# Patient Record
Sex: FEMALE | Race: WHITE | NOT HISPANIC OR LATINO | URBAN - METROPOLITAN AREA
[De-identification: names, ages, dates, MRNs, and addresses within clinical notes are randomized per-mention and may not be internally consistent; named-entity substitution may affect disease eponyms.]

---

## 2021-05-03 ENCOUNTER — OFFICE VISIT (OUTPATIENT)
Dept: URGENT CARE | Facility: CLINIC | Age: 26
End: 2021-05-03
Payer: COMMERCIAL

## 2021-05-03 VITALS
BODY MASS INDEX: 17.36 KG/M2 | HEIGHT: 66 IN | HEART RATE: 62 BPM | WEIGHT: 108 LBS | SYSTOLIC BLOOD PRESSURE: 119 MMHG | RESPIRATION RATE: 18 BRPM | DIASTOLIC BLOOD PRESSURE: 63 MMHG | OXYGEN SATURATION: 98 % | TEMPERATURE: 98.6 F

## 2021-05-03 DIAGNOSIS — L25.9 CONTACT DERMATITIS, UNSPECIFIED CONTACT DERMATITIS TYPE, UNSPECIFIED TRIGGER: Primary | ICD-10-CM

## 2021-05-03 PROCEDURE — 99203 OFFICE O/P NEW LOW 30 MIN: CPT | Performed by: PHYSICIAN ASSISTANT

## 2021-05-03 RX ORDER — METHYLPREDNISOLONE 4 MG/1
TABLET ORAL
Qty: 1 EACH | Refills: 0 | Status: SHIPPED | OUTPATIENT
Start: 2021-05-03

## 2021-05-03 NOTE — PROGRESS NOTES
[facility]        NAME: Eustaquio Severs is a 22 y o  female  : 1995    MRN: [id number]  DATE: May 3, 2021  TIME: 10:09 AM    Assessment and Plan   Contact dermatitis, unspecified contact dermatitis type, unspecified trigger [L25 9]  1  Contact dermatitis, unspecified contact dermatitis type, unspecified trigger  methylPREDNISolone 4 MG tablet therapy pack         Patient Instructions       Follow up with PCP in 3-5 days  Proceed to  ER if symptoms worsen  Chief Complaint     Chief Complaint   Patient presents with    Rash     Rash all over bosy itchy since last saturday          History of Present Illness       Patient is c/o rash for over a week  Pt reports red itchy bumps spread throughout  Pt reports noticing rash after being in the woods  Hx of poison ivy in past   Pt has been using Zyrtec w/ no relief  No fever, chills, body aches, N/V/D  Rash  This is a new problem  The current episode started 1 to 4 weeks ago  The problem has been gradually worsening since onset  The rash is diffuse  The rash is characterized by redness and itchiness  She was exposed to plant contact  Pertinent negatives include no cough, eye pain, fever, shortness of breath, sore throat or vomiting  Review of Systems   Review of Systems   Constitutional: Negative for chills and fever  HENT: Negative for ear pain and sore throat  Eyes: Negative for pain and visual disturbance  Respiratory: Negative for cough and shortness of breath  Cardiovascular: Negative for chest pain and palpitations  Gastrointestinal: Negative for abdominal pain and vomiting  Genitourinary: Negative for dysuria and hematuria  Musculoskeletal: Negative for arthralgias and back pain  Skin: Positive for rash  Negative for color change  Neurological: Negative for seizures and syncope  All other systems reviewed and are negative          Current Medications       Current Outpatient Medications:     methylPREDNISolone 4 MG tablet therapy pack, Use as directed on package, Disp: 1 each, Rfl: 0    Current Allergies     Allergies as of 05/03/2021    (No Known Allergies)            The following portions of the patient's history were reviewed and updated as appropriate: allergies, current medications, past family history, past medical history, past social history, past surgical history and problem list      Past Medical History:   Diagnosis Date    Drug use        History reviewed  No pertinent surgical history  Family History   Problem Relation Age of Onset    No Known Problems Mother     No Known Problems Father          Medications have been verified  Objective   /63   Pulse 62   Temp 98 6 °F (37 °C)   Resp 18   Ht 5' 6" (1 676 m)   Wt 49 kg (108 lb)   SpO2 98%   BMI 17 43 kg/m²   No LMP recorded  Physical Exam     Physical Exam  Constitutional:       Appearance: Normal appearance  HENT:      Head: Normocephalic and atraumatic  Nose: Nose normal       Mouth/Throat:      Mouth: Mucous membranes are moist    Eyes:      Extraocular Movements: Extraocular movements intact  Conjunctiva/sclera: Conjunctivae normal       Pupils: Pupils are equal, round, and reactive to light  Neck:      Musculoskeletal: Normal range of motion and neck supple  Cardiovascular:      Rate and Rhythm: Normal rate  Pulmonary:      Effort: Pulmonary effort is normal    Musculoskeletal: Normal range of motion  Skin:     General: Skin is warm and dry  Capillary Refill: Capillary refill takes less than 2 seconds  Neurological:      General: No focal deficit present  Mental Status: She is alert and oriented to person, place, and time     Psychiatric:         Mood and Affect: Mood normal          Behavior: Behavior normal

## 2024-08-21 ENCOUNTER — OFFICE VISIT (OUTPATIENT)
Dept: URGENT CARE | Facility: CLINIC | Age: 29
End: 2024-08-21
Payer: COMMERCIAL

## 2024-08-21 VITALS
HEART RATE: 88 BPM | DIASTOLIC BLOOD PRESSURE: 80 MMHG | BODY MASS INDEX: 16.71 KG/M2 | OXYGEN SATURATION: 98 % | TEMPERATURE: 97.8 F | RESPIRATION RATE: 20 BRPM | SYSTOLIC BLOOD PRESSURE: 129 MMHG | HEIGHT: 66 IN | WEIGHT: 104 LBS

## 2024-08-21 DIAGNOSIS — L70.0 ACNE VULGARIS: Primary | ICD-10-CM

## 2024-08-21 PROCEDURE — 99213 OFFICE O/P EST LOW 20 MIN: CPT | Performed by: PHYSICIAN ASSISTANT

## 2024-08-21 RX ORDER — BUPRENORPHINE 8 MG/1
TABLET SUBLINGUAL
COMMUNITY
Start: 2024-07-19 | End: 2025-01-15

## 2024-08-21 RX ORDER — CLINDAMYCIN AND BENZOYL PEROXIDE 10; 50 MG/G; MG/G
GEL TOPICAL 2 TIMES DAILY
Qty: 35 G | Refills: 0 | Status: SHIPPED | OUTPATIENT
Start: 2024-08-21

## 2024-08-21 NOTE — PROGRESS NOTES
[facility]        NAME: Serina Ball is a 28 y.o. female  : 1995    MRN: [id number]  DATE: 2024  TIME: 9:53 AM    Assessment and Plan   Acne vulgaris [L70.0]  1. Acne vulgaris  clindamycin-benzoyl peroxide (BENZACLIN) gel            Patient Instructions   Medication as prescribed.  Consider over-the-counter topical Abreva and gel to help with lip pain.  Make new appointment with gynecologist to discuss birth control alternatives.    Follow up with PCP in 3-5 days.  Proceed to  ER if symptoms worsen.    If tests have been performed at [facility], our office will contact you with results if changes need to be made to the care plan discussed with you at the visit.  You can review your full results on [facility].    Chief Complaint     Chief Complaint   Patient presents with    Pain     Face and lip pain and complaints of cystic acne started 5 days ago. Patient had an  24, patient states she was told to take a birth control pill however she threw it away saying she had a bad reaction last time and did not want to become aggressive. She is here today because she cannot take the breakout on her face anymore          History of Present Illness       Patient a 28-year-old female with significant past medical history of substance abuse on Subutex presents the office planing of facial acne with worsening symptoms the last 5 days.  States it is very painful and she now has a lesion on her lip.  Reports having an  a month ago and was instructed to take birth control but states she has had adverse reaction in the past of birth control which makes her very aggressive so she did not take it.  She tried washing her face multiple times and applying topical antibiotic ointment with no relief.        Review of Systems   Review of Systems   Constitutional:  Negative for fever.   Skin:  Positive for rash.         Current Medications       Current Outpatient Medications:      "buprenorphine (SUBUTEX) 8 mg, PLACE 1 TABLET (8 MG TOTAL) UNDER THE TONGUE 2 (TWO) TIMES DAILY. B, Disp: , Rfl:     clindamycin-benzoyl peroxide (BENZACLIN) gel, Apply topically 2 (two) times a day, Disp: 35 g, Rfl: 0    Current Allergies     Allergies as of 08/21/2024    (No Known Allergies)            The following portions of the patient's history were reviewed and updated as appropriate: allergies, current medications, past family history, past medical history, past social history, past surgical history and problem list.     Past Medical History:   Diagnosis Date    Drug use        History reviewed. No pertinent surgical history.    Family History   Problem Relation Age of Onset    No Known Problems Mother     No Known Problems Father          Medications have been verified.        Objective   /80   Pulse 88   Temp 97.8 °F (36.6 °C)   Resp 20   Ht 5' 6\" (1.676 m)   Wt 47.2 kg (104 lb)   LMP 05/22/2024 (Approximate)   SpO2 98%   BMI 16.79 kg/m²   Patient's last menstrual period was 05/22/2024 (approximate).       Physical Exam     Physical Exam  Vitals and nursing note reviewed.   Constitutional:       Appearance: She is well-developed.   HENT:      Head: Normocephalic and atraumatic.      Right Ear: External ear normal.      Left Ear: External ear normal.      Nose: Nose normal.   Eyes:      General: Lids are normal.      Conjunctiva/sclera: Conjunctivae normal.   Skin:     General: Skin is warm and dry.      Capillary Refill: Capillary refill takes less than 2 seconds.      Findings: Rash (Scattered acne to face, chin, and neck.  Single ulceration to lip.) present.   Neurological:      Mental Status: She is alert.                   "

## 2024-12-01 ENCOUNTER — OFFICE VISIT (OUTPATIENT)
Dept: URGENT CARE | Facility: CLINIC | Age: 29
End: 2024-12-01
Payer: COMMERCIAL

## 2024-12-01 VITALS
SYSTOLIC BLOOD PRESSURE: 113 MMHG | OXYGEN SATURATION: 97 % | BODY MASS INDEX: 19.61 KG/M2 | HEART RATE: 65 BPM | RESPIRATION RATE: 16 BRPM | HEIGHT: 66 IN | DIASTOLIC BLOOD PRESSURE: 64 MMHG | TEMPERATURE: 97.6 F | WEIGHT: 122 LBS

## 2024-12-01 DIAGNOSIS — N61.0 MASTITIS: Primary | ICD-10-CM

## 2024-12-01 PROCEDURE — 99213 OFFICE O/P EST LOW 20 MIN: CPT

## 2024-12-01 RX ORDER — CEPHALEXIN 500 MG/1
500 CAPSULE ORAL EVERY 6 HOURS SCHEDULED
Qty: 28 CAPSULE | Refills: 0 | Status: SHIPPED | OUTPATIENT
Start: 2024-12-01 | End: 2024-12-08

## 2024-12-01 RX ORDER — HYDROXYZINE PAMOATE 50 MG/1
50 CAPSULE ORAL 2 TIMES DAILY PRN
COMMUNITY

## 2024-12-01 NOTE — PATIENT INSTRUCTIONS
Keflex as prescribed  Eat yogurt with live and active cultures and/or take a probiotic at least 3 hours before or after antibiotic dose. Monitor stool for diarrhea and/or blood. If this occurs, contact primary care doctor ASAP.     Watch for signs of worsening infection  Tylenol/Ibuprofen for fever  Wash with soap and water (keep clean)  Increase fluid intake  Try warm and/or cold compresses  Rest    Follow up with PCP in 3-5 days.  Proceed to  ER if symptoms worsen.    If tests are performed, our office will contact you with results only if changes need to made to the care plan discussed with you at the visit. You can review your full results on [facility].

## 2024-12-01 NOTE — PROGRESS NOTES
[facility]        NAME: Serina Ball is a 29 y.o. female  : 1995    MRN: [id number]  DATE: 2024  TIME: 2:18 PM    Assessment and Plan   Mastitis [N61.0]  1. Mastitis  cephalexin (KEFLEX) 500 mg capsule            Patient Instructions     Keflex as prescribed  Eat yogurt with live and active cultures and/or take a probiotic at least 3 hours before or after antibiotic dose. Monitor stool for diarrhea and/or blood. If this occurs, contact primary care doctor ASAP.     Watch for signs of worsening infection  Tylenol/Ibuprofen for fever  Wash with soap and water (keep clean)  Iincrease fluid intake  Try warm and/or cold compresses  Rest    Follow up with PCP in 3-5 days.  Proceed to  ER if symptoms worsen.    If tests are performed, our office will contact you with results only if changes need to made to the care plan discussed with you at the visit. You can review your full results on [facility].    Chief Complaint     Chief Complaint   Patient presents with    Breast Pain     Noticed itching on left nipple yesterday, found red spot on nipple. Left breast also tender and painful.          History of Present Illness       9-year-old female arrives reporting left breast pain and a red spot with discharge at the nipple that she noticed yesterday.  Patient reports she has had some discharge and leakage from the nipple at the site of the redness.  Patient does report a history of mastitis and reports this feels similar.  Patient denies any current pregnancy and denies any breast-feeding at present.  Patient denies any fevers.        Review of Systems   Review of Systems   Musculoskeletal:  Positive for arthralgias (left breast pain).         Current Medications       Current Outpatient Medications:     cephalexin (KEFLEX) 500 mg capsule, Take 1 capsule (500 mg total) by mouth every 6 (six) hours for 7 days, Disp: 28 capsule, Rfl: 0    buprenorphine (SUBUTEX) 8 mg, PLACE 1 TABLET (8  "MG TOTAL) UNDER THE TONGUE 2 (TWO) TIMES DAILY. B, Disp: , Rfl:     clindamycin-benzoyl peroxide (BENZACLIN) gel, Apply topically 2 (two) times a day (Patient not taking: Reported on 12/1/2024), Disp: 35 g, Rfl: 0    hydrOXYzine pamoate (VISTARIL) 50 mg capsule, Take 50 mg by mouth 2 (two) times a day as needed, Disp: , Rfl:     Current Allergies     Allergies as of 12/01/2024 - Reviewed 12/01/2024   Allergen Reaction Noted    Varenicline Other (See Comments) 04/14/2022            The following portions of the patient's history were reviewed and updated as appropriate: allergies, current medications, past family history, past medical history, past social history, past surgical history and problem list.     Past Medical History:   Diagnosis Date    Drug use        History reviewed. No pertinent surgical history.    Family History   Problem Relation Age of Onset    No Known Problems Mother     No Known Problems Father          Medications have been verified.        Objective   /64   Pulse 65   Temp 97.6 °F (36.4 °C)   Resp 16   Ht 5' 6\" (1.676 m)   Wt 55.3 kg (122 lb)   LMP 11/28/2024   SpO2 97%   BMI 19.69 kg/m²        Physical Exam     Physical Exam  Vitals and nursing note reviewed.   Constitutional:       General: She is not in acute distress.     Appearance: Normal appearance. She is not ill-appearing.   HENT:      Head: Normocephalic.      Right Ear: External ear normal.      Left Ear: External ear normal.      Nose: Nose normal.   Eyes:      Pupils: Pupils are equal, round, and reactive to light.   Cardiovascular:      Rate and Rhythm: Normal rate and regular rhythm.      Pulses: Normal pulses.      Heart sounds: Normal heart sounds.   Pulmonary:      Effort: Pulmonary effort is normal. No respiratory distress.      Breath sounds: Normal breath sounds. No stridor. No wheezing, rhonchi or rales.   Chest:      Chest wall: Tenderness present.   Breasts:     Left: Bleeding, nipple discharge and " tenderness present.   Musculoskeletal:         General: Normal range of motion.      Cervical back: Normal range of motion and neck supple.   Lymphadenopathy:      Cervical: No cervical adenopathy.   Skin:     General: Skin is warm and dry.      Capillary Refill: Capillary refill takes less than 2 seconds.   Neurological:      General: No focal deficit present.      Mental Status: She is alert and oriented to person, place, and time.   Psychiatric:         Mood and Affect: Mood normal.         Behavior: Behavior normal.

## 2025-05-23 ENCOUNTER — TELEPHONE (OUTPATIENT)
Age: 30
End: 2025-05-23

## 2025-05-23 NOTE — TELEPHONE ENCOUNTER
Caller: Patient    Doctor/Office: Dr vallejo    Call regarding :  toe fungus     Call was transferred to: pod